# Patient Record
Sex: MALE | Race: WHITE
[De-identification: names, ages, dates, MRNs, and addresses within clinical notes are randomized per-mention and may not be internally consistent; named-entity substitution may affect disease eponyms.]

---

## 2019-08-13 ENCOUNTER — HOSPITAL ENCOUNTER (OUTPATIENT)
Dept: HOSPITAL 62 - LAB | Age: 29
End: 2019-08-13
Attending: NURSE PRACTITIONER
Payer: COMMERCIAL

## 2019-08-13 DIAGNOSIS — N46.9: Primary | ICD-10-CM

## 2019-08-13 LAB
SPERM # SMN: 0 X10^6 (ref 33–?)
SPERM CONCENTRATION: 0 X10^6/ML (ref 12–?)

## 2019-08-13 PROCEDURE — 89321 SEMEN ANAL SPERM DETECTION: CPT

## 2019-11-21 ENCOUNTER — HOSPITAL ENCOUNTER (OUTPATIENT)
Dept: HOSPITAL 62 - RAD | Age: 29
End: 2019-11-21
Attending: UROLOGY
Payer: COMMERCIAL

## 2019-11-21 DIAGNOSIS — I86.1: Primary | ICD-10-CM

## 2019-11-21 DIAGNOSIS — N43.3: ICD-10-CM

## 2019-11-21 PROCEDURE — 72192 CT PELVIS W/O DYE: CPT

## 2019-11-21 NOTE — RADIOLOGY REPORT (SQ)
EXAM DESCRIPTION:  CT PELVIS WITHOUT



COMPLETED DATE/TIME:  11/21/2019 12:19 pm



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  
HYDROCELE, UNSPECIFIED



COMPARISON:  None.



TECHNIQUE:  CT scan of the pelvis performed without intravenous or oral contrast.  Images reviewed wi
th soft tissue and bone windows.  Reconstructed coronal and sagittal MPR images reviewed.  All images
 stored on PACS.

All CT scanners at this facility use dose modulation, iterative reconstruction, and/or weight based d
osing when appropriate to reduce radiation dose to as low as reasonably achievable (ALARA).

[facility]: Dose Right  [facility]: CareDose    [facility]: Dose Right    [facility]: Teradose 4D    [facility]: Smart Technologies



RADIATION DOSE:  7 mGy.



LIMITATIONS:  Artifact from lumbar fusion hardware at L4 through S1.



FINDINGS:  PELVIC BONES: No acute fracture. No worrisome bone lesions.

VISUALIZED SPINE: No acute findings.  Post fusion at L3-4, L4-5, and L5-S1 mild bilateral foraminal n
arrowing at L5-S1.

HIPS: No acute fracture or dislocation. No worrisome bone lesions.

PELVIC SOFT TISSUES: No significant findings.  No pelvic masses or adenopathy.

EXTRAPELVIC SOFT TISSUES: No significant findings.  Inferior most scrotum not included in the field o
f view

OTHER: Please note that this study does not include the kidneys or right or left renal vein.



IMPRESSION:  No pelvic soft tissue masses or adenopathy.



TECHNICAL DOCUMENTATION:  JOB ID:  [id number]

Quality ID # [id number]: Final reports with documentation of one or more dose reduction techniques (e.g., Au
tomated exposure control, adjustment of the mA and/or kV according to patient size, use of iterative 
reconstruction technique)

 2011 [employer]- All Rights Reserved



Reading location - IP/workstation name: [email or web address]